# Patient Record
Sex: FEMALE | Race: WHITE | NOT HISPANIC OR LATINO | Employment: FULL TIME | URBAN - METROPOLITAN AREA
[De-identification: names, ages, dates, MRNs, and addresses within clinical notes are randomized per-mention and may not be internally consistent; named-entity substitution may affect disease eponyms.]

---

## 2019-04-08 ENCOUNTER — RECORDS - HEALTHEAST (OUTPATIENT)
Dept: LAB | Facility: CLINIC | Age: 54
End: 2019-04-08

## 2019-04-08 LAB
CHOLEST SERPL-MCNC: 145 MG/DL
FASTING STATUS PATIENT QL REPORTED: NO
HDLC SERPL-MCNC: 62 MG/DL
LDLC SERPL CALC-MCNC: 68 MG/DL
TRIGL SERPL-MCNC: 74 MG/DL

## 2019-04-09 LAB
HCV AB SERPL QL IA: NEGATIVE
HPV SOURCE: NORMAL
HUMAN PAPILLOMA VIRUS 16 DNA: NEGATIVE
HUMAN PAPILLOMA VIRUS 18 DNA: NEGATIVE
HUMAN PAPILLOMA VIRUS FINAL DIAGNOSIS: NORMAL
HUMAN PAPILLOMA VIRUS OTHER HR: NEGATIVE
SPECIMEN DESCRIPTION: NORMAL

## 2019-04-12 LAB
BKR LAB AP ABNORMAL BLEEDING: NO
BKR LAB AP BIRTH CONTROL/HORMONES: NORMAL
BKR LAB AP CERVICAL APPEARANCE: NORMAL
BKR LAB AP GYN ADEQUACY: NORMAL
BKR LAB AP GYN INTERPRETATION: NORMAL
BKR LAB AP HPV REFLEX: NORMAL
BKR LAB AP LMP: 2017
BKR LAB AP PATIENT STATUS: NORMAL
BKR LAB AP PREVIOUS ABNORMAL: NORMAL
BKR LAB AP PREVIOUS NORMAL: NORMAL
HIGH RISK?: NO
PATH REPORT.COMMENTS IMP SPEC: NORMAL
RESULT FLAG (HE HISTORICAL CONVERSION): NORMAL

## 2021-01-04 ENCOUNTER — RECORDS - HEALTHEAST (OUTPATIENT)
Dept: LAB | Facility: CLINIC | Age: 56
End: 2021-01-04

## 2021-01-04 ENCOUNTER — RECORDS - HEALTHEAST (OUTPATIENT)
Dept: ADMINISTRATIVE | Facility: OTHER | Age: 56
End: 2021-01-04

## 2021-01-04 LAB
CHOLEST SERPL-MCNC: 137 MG/DL
FASTING STATUS PATIENT QL REPORTED: NORMAL
HDLC SERPL-MCNC: 60 MG/DL
LDLC SERPL CALC-MCNC: 67 MG/DL
TRIGL SERPL-MCNC: 49 MG/DL

## 2021-01-05 ENCOUNTER — AMBULATORY - HEALTHEAST (OUTPATIENT)
Dept: VASCULAR SURGERY | Facility: CLINIC | Age: 56
End: 2021-01-05

## 2021-01-05 ENCOUNTER — AMBULATORY - HEALTHEAST (OUTPATIENT)
Dept: ADMINISTRATIVE | Facility: CLINIC | Age: 56
End: 2021-01-05

## 2021-01-05 DIAGNOSIS — I83.813 VARICOSE VEINS OF BOTH LOWER EXTREMITIES WITH PAIN: ICD-10-CM

## 2021-02-03 ENCOUNTER — OFFICE VISIT - HEALTHEAST (OUTPATIENT)
Dept: VASCULAR SURGERY | Facility: CLINIC | Age: 56
End: 2021-02-03

## 2021-02-03 DIAGNOSIS — I83.893 VARICOSE VEINS OF BILATERAL LOWER EXTREMITIES WITH OTHER COMPLICATIONS: ICD-10-CM

## 2021-02-03 RX ORDER — SUMATRIPTAN 100 MG/1
100 TABLET, FILM COATED ORAL
Status: SHIPPED | COMMUNITY
Start: 2021-02-03

## 2021-02-03 RX ORDER — IBUPROFEN 200 MG
200-400 TABLET ORAL
Status: SHIPPED | COMMUNITY
Start: 2019-05-29

## 2021-02-03 RX ORDER — ACETAMINOPHEN 325 MG/1
650 TABLET ORAL
Status: SHIPPED | COMMUNITY
Start: 2019-05-29

## 2021-03-18 ENCOUNTER — AMBULATORY - HEALTHEAST (OUTPATIENT)
Dept: NURSING | Facility: CLINIC | Age: 56
End: 2021-03-18

## 2021-04-08 ENCOUNTER — AMBULATORY - HEALTHEAST (OUTPATIENT)
Dept: NURSING | Facility: CLINIC | Age: 56
End: 2021-04-08

## 2021-05-27 VITALS — TEMPERATURE: 98.4 F | SYSTOLIC BLOOD PRESSURE: 106 MMHG | DIASTOLIC BLOOD PRESSURE: 70 MMHG | HEART RATE: 72 BPM

## 2021-06-14 NOTE — PROGRESS NOTES
This is a new consult for Varicose Veins. The patient has varicose veins that are problematic in right legs. Symptoms patient has been experiencing are heaviness, tiredness, discoloration and  swelling. Patient has not been wearing compression stockings.     Discoloration  is present.  Pt  has been using pain medication or antiinflammatory's.

## 2021-06-18 NOTE — PATIENT INSTRUCTIONS - HE
"Patient Instructions by Rani Segura LPN at 2/3/2021  9:20 AM     Author: Rani Segura LPN Service: -- Author Type: Licensed Nurse    Filed: 2/3/2021  9:47 AM Encounter Date: 2/3/2021 Status: Signed    : Rani Segura LPN (Licensed Nurse)       We are prescribing some compression stockings for you. I have included different suppliers that should help you get measured and fitting to ensure proper fitting socks. You should wear these socks as much as you can. It is especially important to wear them with long periods of sitting/standing, long car rides or if you will be flying. Compression socks should get refilled every 4-6 months. They do not need to be worn at night while in bed.    If you do a lot of standing it is good to do calf raises to help keep the blood pumping. If you sit a lot at work it is good to get up periodically to walk around. Elevation of the foot of your bed 4-6\" helps the blood return back to where it is needed.    We would like you to follow up in 3 months after wearing socks to see how you are doing.    Varicose Veins  Varicose veins are swollen, enlarged veins most often found in the legs. They are usually blue or purple in color and may bulge, twist, and stand out under the skin.  Normally, veins return blood from the body to the heart. The leg veins have one-way valves that prevent blood from flowing backward in the vein. When the valves are weak or damaged, blood backs up in the veins. This may cause some of the veins to swell and bulge and become varicose veins.  Symptoms  Varicose veins may or may not cause symptoms. If symptoms do occur, they can include:    Legs that feel tired, achy, heavy, or itchy    Leg muscle cramps    Skin changes, such as discoloration, dryness, redness, or rash (in more severe cases, you may also have sores on the skin called venous leg ulcers)  Risk Factors  There are a number of factors that increase the risk for varicose veins. These " can include:    Being a woman    Being older    Sitting or standing for long periods    Being overweight    Being pregnant    Having a family history of varicose veins  Treatment begins with simple self-help measures (see below). If these dont help, there are many procedures that can be done to shrink or remove varicose veins. Your healthcare provider can tell you more about these options, if needed.  Home care    Support or compression stockings will likely be prescribed. If so, be sure to wear them as directed. They may help improve blood flow.    Exercising helps strengthen your leg muscles and improve blood flow. To get the most benefit, choose exercises such as walking, swimming, or cycling. Also try to exercise for at least 30 minutes on most days.    Raising (elevating) your legs lets gravity help blood flow back to the heart. Sit or lie with your feet above heart level a few times throughout the day, or as directed.    Avoid long periods of sitting or standing. Change positions often. Also, move your ankles, toes and knees often. This may also help improve blood flow.    If you are overweight, talk with your healthcare provider about setting up a weight-loss plan. Maintaining a healthy weight can help reduce the strain on your veins. It may also improve symptoms, such as swelling and aching.    If you have dryness and itching, ask your provider about special lotions that can be applied to the skin to help improve symptoms.  Follow-up care  Follow up with your healthcare provider, or as directed. If imaging tests were done, youll be told the results and if there are any new findings that affect your care.  When to seek medical advice  Call your healthcare provider right away if any of these occur:    Sudden, severe leg swelling, pain, or redness    Symptoms worsen, or they dont improve with self-care    Bleeding from any affected veins    Ulcers form on the legs, ankles, or feet    Fever of 100.4 F (38 C) or  higher, or as advised by your provider  Understanding Spider and Varicose Veins  Do you often hide your legs because of the way they look? You may have noticed tiny red or blue bursts (spider veins). Or maybe you have veins that bulge or look twisted (varicose veins). If so, there are treatments that can help  What are the symptoms?  Spider veins or varicose veins may never be a problem. But sometimes they can cause legs to ache or swell. Your legs may also feel heavy and tired, or like theyre burning. These symptoms may be more severe at the end of the day. Prolonged sitting or standing can also make your symptoms worse.  Who gets spider and varicose veins?  Anyone can get spider or varicose veins. But vein problems tend to be hereditary (run in families). Other factors that can affect veins include:    Pregnancy, hormones, and birth control pills    A job where you stand or sit a lot    Extra weight or lack of exercise    Age         Spider veins look like tiny webs on the ankles, legs, and upper thighs.       Ropy, dark blue, red, or flesh-colored varicose veins are most common on the thighs, calves, and feet.    What can be done?  Spider and varicose veins can affect the way you feel about yourself. Talk to your healthcare provider about your concerns. There are treatments that can ease symptoms and make your legs look better.  Your treatment choices  Treatment may include self-care, sclerotherapy (injecting veins with a chemical), surgery, or newer nonsurgical minimally invasive therapies. Spider veins and some varicose veins can be treated with sclerotherapy. Large varicose veins can often be treated with newer minimally invasive procedures and, in rare cases, surgery may be needed.     Please call [facility] to schedule an appointment. If you received a prescription please bring it with you to your appointment. You may call one of the locations below, although some locations are  limited to what they carry.    Office Locations  New Locations  [facility]  Home Medical Equipment  [address], [place] [address]  Orthotics and Prosthetics ([facility])  [address], [place] [address]  Phone [phone] /Fax [phone]    [facility]   [address]   Medical Equipment [address]/Orthotics and Prosthetics [address]  [place] [address]   Phone: [phone]  Fax [phone]    [facility]  [address]  [address]  [place] [address]  Phone: [phone]  Fax: [phone]    [facility] Medical [facility]ld[facility]   [address]  [place] [address]  Phone: [phone]  Fax: [phone]    [facility] Professional Bldg[facility]  [address]  [place] [address]  Phone: [phone]  Fax: [phone]    [facility]  [address]  [address]  [place] [address]  Phone: [phone]  Fax: [phone]    [facility]  [address]   [place] [address]   Phone: [phone]  Fax: [phone]    [place]   [address] [place] [address]   Phone: [phone]  Fax: [phone]    [facility]   [address]             [address]                 [address]     [place] [address]      [place] [address]         [place] [address]  [phone]  [phone]     [phone]               Fax: [phone]  [email or web address]                                                [facility]  [address] [place] [address]  [phone]  [phone]  [email or web address]    Bobbi  Julian  [phone]  [email or web address]    [facility] Medical supply   [phone]    [place] [address]  [place], [place] [address] [phone]

## 2021-06-27 ENCOUNTER — HEALTH MAINTENANCE LETTER (OUTPATIENT)
Age: 56
End: 2021-06-27

## 2021-06-30 NOTE — PROGRESS NOTES
Progress Notes by Jeff Owusu MD at 2/3/2021  9:20 AM     Author: Jeff Owusu MD Service: -- Author Type: Physician    Filed: 2/6/2021  3:02 PM Encounter Date: 2/3/2021 Status: Signed    : Jeff Owusu MD (Physician)           [facility] Vein Consult      Assessment:     1. varicose veins, bilateral   2. spider veins, bilateral       Plan:     1. Treatment options of conservative therapy of stockings use, exercise, weight loss, elevating legs when possible.    2. Script for compression stockings 20-30 mm hg  3. Ultrasound to evaluate legs for incompetency of both deep and superficial system .   4. Surgical treatment, discussed briefly  5. Follow up: 3 months.   6. Call for any questions concerns or issues    Subjective:      Crystal Rodriguez is a 55 y.o. female  who was referred by Jacqui Blevins MD  for evaluation of varicose veins. Symptoms include pain, aching, fatigue, burning, edema and dermatitis. Patient has history of leg selling, pain and vein issues that have progressed. Pain and symptoms have affected daily living and work activities needing medications. Here for evaluation today. no stocking or compression devic use    Allergies:Patient has no known allergies.    History reviewed. No pertinent past medical history.    History reviewed. No pertinent surgical history.    Current Outpatient Medications   Medication Sig   ? acetaminophen (TYLENOL) 325 MG tablet Take 650 mg by mouth.   ? DOXYCYCLINE CALCIUM ORAL Take by mouth.   ? ibuprofen (ADVIL,MOTRIN) 200 MG tablet Take 200-400 mg by mouth.   ? multivitamin/iron/folic acid (CERTAVITE-ANTIOXIDANT ORAL) Take 1 tablet by mouth.   ? SUMAtriptan (IMITREX) 100 MG tablet Take 100 mg by mouth.       History reviewed. No pertinent family history.     reports that she quit smoking about 4 years ago. She has never used smokeless tobacco.      Review of Systems:  Pertinent items are noted in HPI.  A 12 point comprehensive review of  systems was negative except as noted.   Patient has symptomatic veins and changes of bilateral legs. These have progressed to the point of causing symptoms on a daily basis. This causes issues with daily activities and chores such as washing dishes, vacuuming, outdoor upkeep and standing for long lengths of time       Objective:     Vitals:    02/03/21 0928   BP: 106/70   Pulse: 72   Temp: 98.4  F (36.9  C)   TempSrc: Oral     There is no height or weight on file to calculate BMI.    EXAM:  GENERAL: This is a well-developed 55 y.o. female who appears her stated age  HEAD: normocephalic  HEENT: Pupils equal and reactive bilaterally  MOUTH: mucus membranes intact. Normal dentation  CARDIAC: RRR without murmur  CHEST/LUNG:  Clear to auscultation bilaterally  ABDOMEN: Soft, nontender, nondistended, no masses noted   NEUROLOGIC: Focally intact, nonfocal, alert and oriented x 3  INTEGUMENT: No open lesions or ulcers  VASCULAR: Pulses intact, symmetrical upper and lower extremities. There areskin changes consistent with chronic venous insufficiency. Varicose veins present in bilateral greater saphenous distribution. Spider veins present bilateral.                    Imaging:    pending    Jeff Owusu MD  General Surgery [phone]  Vascular Surgery [phone]

## 2021-08-04 ENCOUNTER — ANCILLARY PROCEDURE (OUTPATIENT)
Dept: VASCULAR ULTRASOUND | Facility: CLINIC | Age: 56
End: 2021-08-04
Attending: SPECIALIST
Payer: COMMERCIAL

## 2021-08-04 ENCOUNTER — OFFICE VISIT (OUTPATIENT)
Dept: VASCULAR SURGERY | Facility: CLINIC | Age: 56
End: 2021-08-04
Attending: SPECIALIST
Payer: COMMERCIAL

## 2021-08-04 VITALS — TEMPERATURE: 98.3 F | HEART RATE: 64 BPM | SYSTOLIC BLOOD PRESSURE: 120 MMHG | DIASTOLIC BLOOD PRESSURE: 78 MMHG

## 2021-08-04 DIAGNOSIS — I83.891 SYMPTOMATIC VARICOSE VEINS, RIGHT: Primary | ICD-10-CM

## 2021-08-04 DIAGNOSIS — I83.893 VARICOSE VEINS OF BILATERAL LOWER EXTREMITIES WITH OTHER COMPLICATIONS: ICD-10-CM

## 2021-08-04 PROCEDURE — 99213 OFFICE O/P EST LOW 20 MIN: CPT | Performed by: SPECIALIST

## 2021-08-04 PROCEDURE — 93970 EXTREMITY STUDY: CPT

## 2021-08-04 PROCEDURE — 93970 EXTREMITY STUDY: CPT | Mod: 26 | Performed by: SURGERY

## 2021-08-04 ASSESSMENT — PAIN SCALES - GENERAL: PAINLEVEL: NO PAIN (0)

## 2021-08-04 NOTE — PROGRESS NOTES
Follow Up: Varicose Veins/ Venous Insufficiency    Crystal Rodriguez is a 55 year old  female here for followup. She has worn stockings now for 3         months. I saw them previously in February / 03 / 2021.  Continued progression of disease and symptoms and issues; reviewed ultrasound results. Patient has ongoing symptoms with pain and swelling needing intervention with pain meds secondary to interfering with daily activities and work. This now inhibits daily chores and activities.     Allergies:Patient has no known allergies.    No past medical history on file.    No past surgical history on file.    CURRENT MEDS:  Current Outpatient Medications   Medication     acetaminophen (TYLENOL) 325 MG tablet     ibuprofen (ADVIL,MOTRIN) 200 MG tablet     multivitamin/iron/folic acid (CERTAVITE-ANTIOXIDANT ORAL)     SUMAtriptan (IMITREX) 100 MG tablet     No current facility-administered medications for this visit.       No family history on file.     reports that she quit smoking about 4 years ago. She has never used smokeless tobacco.    Review of Systems:  Negative except symptomatic veins both sides progressed under conservative therapy and treatment  Patient has symptomatic veins and changes of bilateral legs. These have progressed to the point of causing symptoms on a daily basis. This causes issues with daily activities and chores such as washing dishes, vacuuming, outdoor upkeep and standing for long lengths of time. She has worn compression now for greater than 3 months, worked on an exercise and weight loss program and continues to have symptoms.     OBJECTIVE:  Vitals:    08/04/21 1038   BP: 120/78   Pulse: 64   Temp: 98.3  F (36.8  C)     There is no height or weight on file to calculate BMI.    EXAM:  GENERAL: This is a well-developed 55 year old female who appears her stated age  HEAD: normocephalic  HEENT: Pupils equal and reactive bilaterally  CARDIAC: RRR without murmur  CHEST/LUNG:  Clear to  auscultation  ABDOMEN: Soft, nontender, nondistended, no masses    NEUROLOGIC: Focally intact, nonfocal  VASCULAR: Pulses intact, symmetrical upper and lower extremities. Varicose veins, Spider veins and Chronic venous stasis changes, right    Imaging:    Study Result    Narrative & Impression   BILATERAL Venous Insufficiency Ultrasound (Date: 08/04/21)    BILATERAL Lower Extremity          Indication:  Symptomatic varicose veins/pain/swelling     Previous: None     Patient History: Swelling and Stasis     Presenting Symptoms:  Swelling, Varicose Veins, Pain and Stasis     Technique:   Supine and Upright Ultrasound of the Deep and Superficial Veins with Valsalva and Compression Augmentation Maneuvers. Duplex Imaging is performed utilizing gray-scale, Two-dimensional images, color-flow imaging, Doppler waveform analysis, and Spectral doppler imaging done with provacative maneuvers.      Incompetency Criteria:  Deep vein reflux reported when greater than 1000 msec flow reversal. Superficial vein reflux reported when equal to or greater than 600 msec flow reversal.  vein reflux reported as greater than 350 msec flow reversal.      Right  Leg Deep Veins    CFV SFJ PFV PROX FV   PROX FV MID FV DIST POP V. PERON.   V. PTV'S   Compressibility  (FC,PC,NC) FC FC FC FC FC FC FC FC FC   Reflux +   - - - - -   -         Right Leg Saphenous Veins  Location SFJ PROX THIGH KNEE MID CALF SPJ PROX CALF MID CALF   RT GSV (mm) 10 8 8 4         Reflux + + + +         RT SSV (mm)         4 4 3   Reflux         - - -      Left Leg Deep Veins    CFV SFJ PFV PROX SFV PROX SFV MID SFV DIST POP V. PERON. V. PTV'S   Compressibility  (FC,PC,NC) FC FC FC FC FC FC FC FC FC   Reflux -   - - - - -   -         Lt Leg Saphenous Veins   Location SFJ PROX THIGH KNEE MID CALF SPJ PROX CALF MID CALF   LT GSV (mm) 7 5 6 3         Reflux - - - -         LT SSV (mm)         5 4 3   Reflux         - - -      Impression:        Reference:     Compressibility: FC= Fully compressible, PC= Partially compressible, NC= Non-compressible, NV= Not Visualized     Reflux: (+) Incompetent  (-) Competent, (NV) = Not Visualized     Interpretation criteria:          Duration of Retrograde flow (milliseconds)  Category Deep Veins Superficial Veins  veins   Competent < 1000ms < 600ms < 350ms   Incompetent > 1000ms > 600ms > 350ms                Assessment/Plan:    She has  incompetency and insuffiencey of the [place]  Greater  saphenous vein.  Right measures 10 mm  at the junction. Deep systems are intact. No DVTs. Great candidate for endovenous  closure. We spent 20 minutes today and discussed the procedure. The risks of anesthesia, infection, bleeding, clotting, DVTs, numbess at the insertion site dermatome and  the process and procedure were discussed. Answered all questions today. Will submit this to Crystal Rodriguez's insurance if needed for pre approval.Will set this up when approved. Discussed need to have a  and procedure woul take around 30 minutes with total time 2-3 hours. Also understands a small screening ultrasound 2-3 days out to rule out clot formation at the closed vessel.    DIAGNOSIS: Venous insufficiency of the  right greater saphenous vein      PROCEDURE: Endovenous closure of the right greater saphenous vein    Jeff Owusu MD  [facility] Surgery Dept.

## 2021-08-04 NOTE — PATIENT INSTRUCTIONS
Varicose Vein Pre-Procedure Instructions/Vein Ablation     You are scheduled for a varicose vein treatment on your legs. The following is some helpful information for you, in regards to your treatment.    **Important:  A  will be needed post procedure.  (Unable to use Taxi or Uber).     We will supply a thigh high compression stocking for you. Please bring your compression sock as we only have sizes medium to X-large.    Please be aware, it is not advised to fly within 3 weeks post procedure    Please wear comfortable clothing.  We recommend that you bring a change of under clothes; they may get stained by the cleansing solution.    Feel free to bring a personal music player or a CD to listen to during your procedure.    Take your routine medications as you normally would with exception to blood thinners. Aspirin is ok to continue.    If you take Warfarin, Xarelto, or Eliquis this will need to be HELD prior to procedure according to primary care provider/doctor or cardiology who prescribes this medication.    Please notify us if you take this medication.    It is ok to eat prior to this procedure.    Please allow 1- 2 hours for your appointment.    For any questions regarding your procedure please call   [phone] to speak with the nurse.    If you would like a Good Jillian Estimate for your upcoming service/procedure contact Cost of Care Estimates at [phone], advocates are available Monday through Friday 8am - 5pm.    Radiofrequency Ablation Codes  26517 for first vein in either leg  89906 for second vein    Please have the following information available:  1. Patient name and date of birth  2. Insurance company, plan name, ID and group numbers  3. Description of the service/procedure and the associated procedure code numbers, if available. If more than one (1) procedure code, indicate which will be the primary procedure code.   4. The facility where the service/procedure will be performed.  5. The  name of the physician involved with the service/procedure.  6. Appointment date of service.  7. Telephone number to call with the information.    Radiofrequency Ablation (RFA) Treatment for Varicose Veins    Radiofrequency ablation (RFA) is a procedure to treat varicose veins. It uses heat created from radiofrequency (RF).    Varicose veins are swollen, enlarged veins. They happen most often in the legs. Varicose veins can develop when valves in your veins become damaged. This causes problems with blood flow. Over time, too much blood collects in your veins. The veins may bulge, twist, and stand out under your skin. They can also cause symptoms such as aching, cramping, or swelling in your legs.    During RFA treatment, RF heat is sent into your vein through a thin, flexible tube (catheter). This closes off blood flow in the main problem vein.           With RFA treatment, a catheter that contains RF heat is used to seal off the main problem vein.      Getting ready for your treatment  Follow any instructions from your healthcare provider.    Tell your provider if you:    Are pregnant or think you may be pregnant    Are breastfeeding    Smoke or use alcohol on a regular basis    Have any allergies or intolerances to certain medicines. Explain what reaction you have had to these medicines in the past.    Tell your provider about any medicines you are taking. You may need to stop taking all or some of these before the test. This includes:    Medicines that can thin your blood or prevent clotting (anticoagulants)    All prescription medicines    Over-the-counter medicines such as aspirin or ibuprofen    Street drugs    Herbs, vitamins, and other supplements    Follow any directions you're given for not eating or drinking before the procedure.    The day of your treatment  The treatment takes 45 to 60 minutes. The entire treatment (including time to prepare and recover) takes about 1 to 3 hours. You can go home the same  day. For the treatment:     You'll lie down on a hospital bed.    An imaging method, such as ultrasound, is used to guide the procedure.    The leg to be treated is injected with numbing medicine.    Once your leg is numb, a needle makes a small hole (puncture) in the vein to be treated.    The catheter with the RF heat source is inserted into your vein.    More numbing medicine may be injected around your vein.    Once the catheter is in the right position, it is then slowly drawn backward. As the catheter sends out heat, the vein is closed off.    In some cases, other side branch varicose veins may be removed or tied off through a few small cuts (incisions).    When the treatment is done, the catheter is removed. Pressure is applied to the insertion site to stop any bleeding. An elastic compression stocking or a bandage may then be put on your leg.    Recovering at home  Once at home, follow all the instructions you've been given. Be sure to:    Take all medicines as directed    Care for the catheter insertion site as directed    Check for signs of infection at the catheter insertion site (see below)    Wear elastic stockings or bandages as directed    Keep your legs raised (elevated) as directed    Walk a few times a day    Avoid heavy exercise, lifting, and standing for long periods as advised    Avoid air travel, hot baths, saunas, or whirlpools as advised    Call your healthcare provider  Call your healthcare provider if you have any of the following:    Fever of 100.4 F (38 C) or higher, or as directed by your provider    Chest pain or trouble breathing    Signs of infection at the catheter insertion site. These include increased redness or swelling (inflammation), warmth, increasing pain, bleeding, or bad-smelling discharge.    Severe numbness or tingling in the treated leg    Severe pain or swelling in the treated leg       Follow-up  You'll have a follow-up visit with your healthcare provider within a  week. An ultrasound will be done to check for problems, such as blood clots. Your provider will discuss further treatments with you, if needed.  Risks and possible complications   These include the following:    Bleeding    Infection    Blood clots    Damage to the nerves in the treated area    Irritation or burning of the skin over the treated vein    Treatment doesn't improve the look or the symptoms of the problem veins    Risks of any medicines used during the treatment      Date Last Reviewed: 5/1/2016 2000-2017 [employer]. [address] [place], [place] [id number]. All rights reserved. This information is not intended as a substitute for professional medical care. Always follow your healthcare professional's instructions.    Self-Care for Spider and Varicose Veins  Your healthcare provider may suggest that you try self-care. Exercising and maintaining a healthy weight may keep problem veins from getting worse. Wearing elastic stockings and elevating your legs can help improve blood flow. Taking breaks when you sit or stand helps, too.         The top of the elastic stocking should be below the bend in your knee for a proper fit.      Exercising  Exercising is good for your veins because it improves blood flow. Walking, cycling, or swimming are great exercises for vein health. But be sure to check with your healthcare provider before starting any exercise program. Also, keep these hints in mind:    When exercising, start out slowly and try to build up to 30 minutes on most days.    Elevate your legs above heart level after exercise to keep blood from pooling in veins.    Maintaining a healthy weight  Being overweight puts extra pressure on your veins. To maintain a healthy weight, try these tips:    Choose lean meats, fish, and skinless chicken.    Use low-fat dairy products.    Eat foods high in fiber, such as whole grains, fruits, and vegetables.    Cut down on sugar, salt, and saturated and  trans fats.    Exercise regularly.    Wearing elastic stockings  Elastic stockings gently squeeze veins so blood flows upward. If you need elastic stockings, your healthcare provider can prescribe them for you. Follow your healthcare provider's advice about how and when to wear them. Elastic stockings come in several different levels of pressure. Ask your healthcare provider which level of pressure would benefit you the most.     Elevating your legs  Raising your legs above heart level will help relieve swelling and keep blood from pooling in veins. Try to elevate your legs for 15 to 20 minutes at the end of the day, and whenever you're relaxing. To make sure your legs are raised above heart level, prop them up on cushions or large pillows.    When sitting and standing  To keep blood moving when you have to sit or stand for long periods, try these tips:    At work, take walking breaks instead of coffee breaks. Walk during your lunch hour. Or try flexing your feet up and down 10 times each hour.    When standing, raise yourself up and down on your toes, or rock back and forth on your heels.    Date Last Reviewed: 5/1/2016 2000-2017 [employer]. [address] [place] [address]. All rights reserved. This information is not intended as a substitute for professional medical care. Always follow your healthcare professional's instructions.    [facility]  [address]  [place] [address]    [place] [phone]  [phone]  [place] [phone]  [email or web address]    [facility]   [address]             [address]                 [address]     [place] [address]      [place] [address]         [place] [address]  [phone]  [phone]            [phone]                Fax:  [phone]  [email or web address]                                              [facility]  [address] [place] [address]  [phone]  [phone]  [email or web address]    Bobbi Jordan  [phone]  [email or web address]    [facility]   [phone]    [place] [address] [place] [address]  [phone]

## 2021-10-17 ENCOUNTER — HEALTH MAINTENANCE LETTER (OUTPATIENT)
Age: 56
End: 2021-10-17

## 2021-10-20 ENCOUNTER — TELEPHONE (OUTPATIENT)
Dept: VASCULAR SURGERY | Facility: CLINIC | Age: 56
End: 2021-10-20
Payer: COMMERCIAL

## 2021-10-20 NOTE — TELEPHONE ENCOUNTER
message left for  patient regarding vein ablation procedure next week. Advised to bring a . Explained it is ok to eat and drink prior to procedure with exception of blood thinner. Verified patient's insurance [employer]. We will supply patient with a thigh high compression sock. We carry from size medium to extra large. If patient doesn't fit into them they will need to provide their own. Patient will call if any further questions.

## 2021-11-17 NOTE — TELEPHONE ENCOUNTER
Second message left. Advised to call with any questions.  Advised no vigorous activities for 2 weeks and no flights for 3 weeks.

## 2021-12-01 ENCOUNTER — OFFICE VISIT (OUTPATIENT)
Dept: VASCULAR ULTRASOUND | Facility: CLINIC | Age: 56
End: 2021-12-01
Attending: SPECIALIST
Payer: COMMERCIAL

## 2021-12-01 VITALS — SYSTOLIC BLOOD PRESSURE: 124 MMHG | HEART RATE: 76 BPM | TEMPERATURE: 98.1 F | DIASTOLIC BLOOD PRESSURE: 70 MMHG

## 2021-12-01 DIAGNOSIS — I83.891 SYMPTOMATIC VARICOSE VEINS, RIGHT: ICD-10-CM

## 2021-12-01 PROCEDURE — 258N000003 HC RX IP 258 OP 636: Performed by: SPECIALIST

## 2021-12-01 PROCEDURE — 272N000103 US ENDOVENOUS ABLATION RADIOFREQ 1ST VEIN RT

## 2021-12-01 PROCEDURE — 36475 ENDOVENOUS RF 1ST VEIN: CPT | Mod: RT | Performed by: SPECIALIST

## 2021-12-01 PROCEDURE — 250N000009 HC RX 250: Performed by: SPECIALIST

## 2021-12-01 RX ADMIN — LIDOCAINE HYDROCHLORIDE 10 ML: 10 INJECTION, SOLUTION INFILTRATION; PERINEURAL at 12:52

## 2021-12-01 RX ADMIN — LIDOCAINE HYDROCHLORIDE,EPINEPHRINE BITARTRATE: 10; .01 INJECTION, SOLUTION INFILTRATION; PERINEURAL at 12:52

## 2021-12-01 ASSESSMENT — PAIN SCALES - GENERAL: PAINLEVEL: MODERATE PAIN (5)

## 2021-12-01 NOTE — PATIENT INSTRUCTIONS
Home Care Instructions Following Radiofrequency Closure of the Greater Saphenous Vein (VNUS)    Dressing    Wear your compression for 48 hours continuously until your ultrasound after the procedure.  You can remove your stocking to shower in 24 hours but then reapply after.    Wear the stocking for two weeks after the procedure while you are up.    Activity    The day of the procedure make sure to walk around the house for a few minutes each hour until bedtime.    The day after the procedure you should advance activity as tolerated.  NO strenuous activities though for 2 weeks.  In general avoid prolonged standing in place and sitting with your legs down.  Both of these activities will cause blood to pool in your legs resulting in more swelling and discomfort.    Do not drive or operate heavy machinery for 24 hours after the procedure.    Do not take baths or use hot tubs or swimming pools until your incision site is healed.    Do not fly for the Next 3 weeks.    Post Procedure Ultrasound    You will need an ultrasound within 2-3 days following the closure procedure.  Please schedule an ultrasound if you have not already done so.    Post Procedure Signs and Symptoms    There can be a mild amount of bruising and numbness after this procedure.  This will typically take a few weeks to fade.    You may feel pain or tenderness in your inner thigh.  Mild pain medication such as Tylenol or Ibuprofen maybe helpful.    It is normal to have fluid leaking from the injection areas the day of the procedure and it may be blood tinged.    Call if you have    Fever greater than 100.8 degrees F.    Uncontrolled bleeding from the incision site.    Pain that is not relieved by rest or pain medication.    Shortness of breath    Follow -up    Please plan to see your surgeon in the office two weeks after your procedure    Call us to schedule this appointment if one has not already been made    You will receive a phone call from our  staff within 48 hours of your procedure.  Please have these instruction near by so that any questions can be addressed at that time.  If you have any concerns before that time, please do not hesitate to call us at [phone] and ask to speak to a nurse.  We want you to have a smooth recovery.    For emergencies after 4:30pm Monday - Friday, holidays and weekends:  For Dr Jeff Owusu call [facility] at [phone]  [facility] [phone]

## 2021-12-01 NOTE — PROGRESS NOTES
VNUS Radiofrequency Ablation    Pre-Procedure:  Admit  [x]Consent for Radio Frequency Ablation of the   [x]Right Saphenous Vein and/or branches  []Left Saphenous Vein and/or branches  Vitals  [x]Vital signs per routine    Nursing Orders  [x]Vein Mapping of  [x]R extremity  []L extremity  [x]Sterile Prep to extremity  [x]Obtain Tumescent Solution 500mL (NS 1000mL, 1% Lidocaine w Epi 56mL, 8.4% Sodium Bicarbonate 5.6mL)    Medications  []Diazepam (Valium) 5mg PO, May Repeat x 1 for anxiety, relaxtion.    Post-Procedure:  Admission  [x]Post Procedure care - call physician for status update  []Admit to inpatient - Please document reason for admission in chart    Interventions require inpatient services    High risk of deterioration due to comorbidities    High risk of deterioration due to nature of admitting diagnosis  Location:    Vitals  [x]Vital signs per routine    Nursing Orders  [x]Thigh High Compression Stocking 20-30mm or 30-40mm to  [x]R extremity  []L extremity  [x]Check insertion site for bleeding or hematoma.  If bleeding or hematoma occurs, apply pressure and notify MD    Discharge  [x]Discharge home if no complications arise.  [x]Give post radiofrequency ablation discharge instructions to patient.  [x]Follow up venous ultrasound 72-96 hours after procedure.  [x]Follow up appointment with MD at 2 weeks.  [x]Contact MD for further questions

## 2021-12-03 ENCOUNTER — ANCILLARY PROCEDURE (OUTPATIENT)
Dept: VASCULAR ULTRASOUND | Facility: CLINIC | Age: 56
End: 2021-12-03
Attending: SPECIALIST
Payer: COMMERCIAL

## 2021-12-03 DIAGNOSIS — I83.891 SYMPTOMATIC VARICOSE VEINS, RIGHT: ICD-10-CM

## 2021-12-03 PROCEDURE — 93971 EXTREMITY STUDY: CPT | Mod: RT

## 2021-12-03 PROCEDURE — 93971 EXTREMITY STUDY: CPT | Mod: 26 | Performed by: SURGERY

## 2021-12-21 ENCOUNTER — OFFICE VISIT (OUTPATIENT)
Dept: VASCULAR SURGERY | Facility: CLINIC | Age: 56
End: 2021-12-21
Attending: SPECIALIST
Payer: COMMERCIAL

## 2021-12-21 VITALS — HEART RATE: 72 BPM | SYSTOLIC BLOOD PRESSURE: 130 MMHG | TEMPERATURE: 98.7 F | DIASTOLIC BLOOD PRESSURE: 74 MMHG

## 2021-12-21 DIAGNOSIS — I83.893 VARICOSE VEINS OF BILATERAL LOWER EXTREMITIES WITH OTHER COMPLICATIONS: Primary | ICD-10-CM

## 2021-12-21 PROCEDURE — 99212 OFFICE O/P EST SF 10 MIN: CPT | Performed by: SPECIALIST

## 2021-12-21 PROCEDURE — G0463 HOSPITAL OUTPT CLINIC VISIT: HCPCS

## 2021-12-21 ASSESSMENT — PAIN SCALES - GENERAL: PAINLEVEL: EXTREME PAIN (8)

## 2021-12-21 NOTE — PATIENT INSTRUCTIONS
Use compression socks as much as possible when on feet, long car rides and on air planes.  Return to clinic in 4 months.  Call if any questions [phone]

## 2021-12-29 NOTE — PROGRESS NOTES
Post Procedure Note Endovenous Closure    S: Crystal Rodriguez is a 56 year old female S/P Right  leg endovenous closure ofRight lower ext. Two weeks out from procedure. Doing well, wore female  thigh high socks. Minimal discomfort. Some superficial phlibitis. Which is resolving.     O:   Vitals:    12/21/21 1010   BP: 130/74   Pulse: 72   Temp: 98.7  F (37.1  C)       Status: doing well    General: no apparent distress  Legs look good no signs of infection, incisions healing nicely.      VCSS  PAIN:: Severe: Daily, severe limiting activities or requiring regular use of pain meds  Varicose Veins:: Mild: Few scattered  Venous Edema:: Absent: None  Skin Pigmentation:: Absent: None  Inflamation:: Absent: None  Induration:: Absent: None  Number of active ulcers:: 0  Active ulcer duration:: None  Active ulcer diameter:: None  Compression Therapy:: Not used or patient not compliant  VCSS Score:: 4  Side:: Bilateral      Imaging:    Study Result    Narrative & Impression   Right Venous Ultrasound Status Post Radiofrequency Ablation (Date: 12/03/21)        Indication: Follow-up saphenous vein Radiofrequency ablation     Date of Procedure: Right 12/01/21       Right CFV/SFJ Compression:  Fully Compressible     Reference:   (FC)-Fully Compressible           (PC)-Partially Compressible   (NC) Non-Compressible     Location Right GSV   Proximal Thigh NC   Mid Thigh NC   Distal Thigh NC   Knee NC   Proximal Calf FC      Impression: Successful ablation of right leg greater saphenous vein from the proximal thigh to the knee level.  No evidence of DVT in the right common femoral vein       A/P: S/p endovenous closure. For insuffiencey of veins     May switch to knee high support socks   Resume all activities   RTC 4 months   Call for any questions or concerns     Jeff Owusu MD   [facility]

## 2022-05-17 ENCOUNTER — OFFICE VISIT (OUTPATIENT)
Dept: VASCULAR SURGERY | Facility: CLINIC | Age: 57
End: 2022-05-17
Attending: SPECIALIST
Payer: COMMERCIAL

## 2022-05-17 VITALS
TEMPERATURE: 97.6 F | RESPIRATION RATE: 12 BRPM | HEART RATE: 56 BPM | SYSTOLIC BLOOD PRESSURE: 116 MMHG | DIASTOLIC BLOOD PRESSURE: 78 MMHG

## 2022-05-17 DIAGNOSIS — I83.893 VARICOSE VEINS OF BILATERAL LOWER EXTREMITIES WITH OTHER COMPLICATIONS: Primary | ICD-10-CM

## 2022-05-17 PROCEDURE — 99213 OFFICE O/P EST LOW 20 MIN: CPT | Performed by: SPECIALIST

## 2022-05-17 ASSESSMENT — PAIN SCALES - GENERAL: PAINLEVEL: MILD PAIN (2)

## 2022-05-17 NOTE — PROGRESS NOTES
[facility]      Patient is here for a 4 month follow up  to discuss post right RFA of GSV. Patient stated leg is better but she does still have some pain rated 2 out of 10. Taking Tylenol occasionally.     Pt is currently taking no meds that would impact our treatment plan.    /78   Pulse 56   Temp 97.6  F (36.4  C) (Oral)   Resp 12   The provider has been notified that the patient has no concerns.     Questions patient would like addressed today are: If some of the bulging areas will still go away on the right leg.    Refills are needed: N/A    Has homecare services and agency name:  Latonia SWARTZ RN

## 2022-05-17 NOTE — PATIENT INSTRUCTIONS
"You should wear compression as much as you can. It is especially important to wear compression with long periods of sitting/standing, long car rides or if you will be flying. Compression socks should get refilled every 4-6 months. If you do a lot of standing it is good to do calf raises to help keep the blood pumping. If you sit a lot at work it is good to get up periodically to walk around. Elevation of the foot of your bed 4-6\" helps the blood return back to where it is needed. They do not need to be   worn at night while in bed. We can refill your compression prescription for 1 year otherwise your primary is able to refill them for you.    Please call us with any issues or questions [phone].    [facility]    [facility]  [address]  [place] [address]  Phone: [phone]    [facility]   [address] ([facility] [address]  Phone: [phone]    [facility]  [address]   [place] [address]   Phone: [phone]    [facility]  [address]  [place] [address]  Phone: [phone]    [facility]   [address]  [place] [address]  Phone: [phone]    [facility]  [address]  [place] [address]  Phone: [phone]    [facility]  [address]  [address]  [place] [address]  Phone: [phone]    [place]   [address] [place] [address]   Phone: [phone]      [facility]   [address]             [address]                 [address]     [place] [address]      [place] [address]         [place] [address]  Phone: " [phone]      Phone: [phone]            Phone: [phone]  Fax: [phone]          Fax:[phone]                 Fax: [phone]                                     Bobbi Jordan  [phone]  [email or web address]

## 2022-05-24 NOTE — PROGRESS NOTES
[facility] Surgery Follow up    HPI:    56 year old year old female who returns for a follow up.  She is here for 4-month follow-up.  She had an RFA done of her right GSV.  She has decrease of her veins but still has some that are enlarged and causing symptoms she rates her pain at about a 2 out of 10 she takes medications for it occasionally.  She comes in to seek what her options may be in count of what things could have are available for her.  She is otherwise in normal state of health she wears compression regularly.    Allergies:Patient has no known allergies.    History reviewed. No pertinent past medical history.    History reviewed. No pertinent surgical history.    CURRENT MEDS:  Current Outpatient Medications   Medication     acetaminophen (TYLENOL) 325 MG tablet     ibuprofen (ADVIL,MOTRIN) 200 MG tablet     multivitamin/iron/folic acid (CERTAVITE-ANTIOXIDANT ORAL)     SUMAtriptan (IMITREX) 100 MG tablet     No current facility-administered medications for this visit.       History reviewed. No pertinent family history.     reports that she quit smoking about 5 years ago. She has never used smokeless tobacco.    Review of Systems:  Negative except still some remaining veins in the right leg status post closure.  Otherwise normal state of health.Otherwise twelve system of review is negative.      OBJECTIVE:  Vitals:    05/17/22 1002   BP: 116/78   Pulse: 56   Resp: 12   Temp: 97.6  F (36.4  C)   TempSrc: Oral     There is no height or weight on file to calculate BMI.    Status: doing well    EXAM:  GENERAL: This is a well-developed 56 year old female who appears her stated age  HEAD: normocephalic  HEENT: Pupils equal and reactive bilaterally  CARDIAC: RRR without murmur  CHEST/LUNG:  Clear to auscultation  ABDOMEN: Soft, nontender, nondistended, no masses    NEUROLOGIC: Focally intact, nonfocal  VASCULAR: Pulses intact, symmetrical upper and lower extremities.    Side:: Right  VCSS  PAIN:: Moderate: Daily  moderate activity limitation, occasional pain medication  Varicose Veins:: Mild: Few scattered  Venous Edema:: Absent: None  Skin Pigmentation:: Absent: None  Inflamation:: Absent: None  Induration:: Absent: None  Number of active ulcers:: 0  Active ulcer duration:: None  Active ulcer diameter:: None  Compression Therapy:: Intermittent use of stockings  VCSS Score:: 4  CEAP:: Simple varicose veins only    LABS:  No results found for: WBC, HGB, HCT, MCV, PLT  INR/Prothrombin Time  @LABRCNTIP(NA,K,CL,co2,bun,creatinine,labglom,glucose,calcium)@  No results found for: HGBA1C  No results found for: ALT, AST, GGT, ALKPHOS, BILITOT     Images: Reviewed her past studies procedure notes and post procedure notes    PACS Images     Show images for US Venous Post Ablation Leg Right         Study Result    Narrative & Impression   Right Venous Ultrasound Status Post Radiofrequency Ablation (Date: 12/03/21)        Indication: Follow-up saphenous vein Radiofrequency ablation     Date of Procedure: Right 12/01/21       Right CFV/SFJ Compression:  Fully Compressible     Reference:   (FC)-Fully Compressible           (PC)-Partially Compressible   (NC) Non-Compressible     Location Right GSV   Proximal Thigh NC   Mid Thigh NC   Distal Thigh NC   Knee NC   Proximal Calf FC      Impression: Successful ablation of right leg greater saphenous vein from the proximal thigh to the knee level.  No evidence of DVT in the right common femoral vein         Assessment/Plan:   Varicose veins of bilateral lower extremities with other complications     Patient has some remaining veins we discussed doing stab phlebectomy versus just conservative therapy and management at this time point she can think about things get back to us.  In the meantime or new prescription for socks were given answered all questions and will be available if she decides to proceed    Return if symptoms worsen or fail to improve.     Jeff Owusu MD ,MD  [facility]  Department of Surgery

## 2022-07-24 ENCOUNTER — HEALTH MAINTENANCE LETTER (OUTPATIENT)
Age: 57
End: 2022-07-24

## 2022-08-02 ENCOUNTER — LAB REQUISITION (OUTPATIENT)
Dept: LAB | Facility: CLINIC | Age: 57
End: 2022-08-02

## 2022-08-02 DIAGNOSIS — Z80.8 FAMILY HISTORY OF MALIGNANT NEOPLASM OF OTHER ORGANS OR SYSTEMS: ICD-10-CM

## 2022-08-02 LAB — TSH SERPL DL<=0.005 MIU/L-ACNC: 1.27 UIU/ML (ref 0.3–4.2)

## 2022-08-02 PROCEDURE — 84443 ASSAY THYROID STIM HORMONE: CPT | Performed by: PHYSICIAN ASSISTANT

## 2022-10-02 ENCOUNTER — HEALTH MAINTENANCE LETTER (OUTPATIENT)
Age: 57
End: 2022-10-02

## 2023-08-12 ENCOUNTER — HEALTH MAINTENANCE LETTER (OUTPATIENT)
Age: 58
End: 2023-08-12

## 2024-02-27 ENCOUNTER — LAB REQUISITION (OUTPATIENT)
Dept: LAB | Facility: CLINIC | Age: 59
End: 2024-02-27

## 2024-02-27 DIAGNOSIS — Z13.6 ENCOUNTER FOR SCREENING FOR CARDIOVASCULAR DISORDERS: ICD-10-CM

## 2024-02-27 DIAGNOSIS — G47.00 INSOMNIA, UNSPECIFIED: ICD-10-CM

## 2024-02-27 PROCEDURE — 84443 ASSAY THYROID STIM HORMONE: CPT | Performed by: FAMILY MEDICINE

## 2024-02-27 PROCEDURE — 80053 COMPREHEN METABOLIC PANEL: CPT | Performed by: FAMILY MEDICINE

## 2024-02-27 PROCEDURE — 80061 LIPID PANEL: CPT | Performed by: FAMILY MEDICINE

## 2024-02-28 LAB
ALBUMIN SERPL BCG-MCNC: 4.5 G/DL (ref 3.5–5.2)
ALP SERPL-CCNC: 101 U/L (ref 40–150)
ALT SERPL W P-5'-P-CCNC: 27 U/L (ref 0–50)
ANION GAP SERPL CALCULATED.3IONS-SCNC: 12 MMOL/L (ref 7–15)
AST SERPL W P-5'-P-CCNC: 29 U/L (ref 0–45)
BILIRUB SERPL-MCNC: 0.8 MG/DL
BUN SERPL-MCNC: 17.7 MG/DL (ref 8–23)
CALCIUM SERPL-MCNC: 9.6 MG/DL (ref 8.8–10.2)
CHLORIDE SERPL-SCNC: 107 MMOL/L (ref 98–107)
CHOLEST SERPL-MCNC: 150 MG/DL
CREAT SERPL-MCNC: 0.88 MG/DL (ref 0.51–0.95)
DEPRECATED HCO3 PLAS-SCNC: 26 MMOL/L (ref 22–29)
EGFRCR SERPLBLD CKD-EPI 2021: 75 ML/MIN/1.73M2
FASTING STATUS PATIENT QL REPORTED: ABNORMAL
GLUCOSE SERPL-MCNC: 100 MG/DL (ref 70–99)
HDLC SERPL-MCNC: 63 MG/DL
LDLC SERPL CALC-MCNC: 57 MG/DL
NONHDLC SERPL-MCNC: 87 MG/DL
POTASSIUM SERPL-SCNC: 4.1 MMOL/L (ref 3.4–5.3)
PROT SERPL-MCNC: 6.8 G/DL (ref 6.4–8.3)
SODIUM SERPL-SCNC: 145 MMOL/L (ref 135–145)
TRIGL SERPL-MCNC: 151 MG/DL
TSH SERPL DL<=0.005 MIU/L-ACNC: 1.15 UIU/ML (ref 0.3–4.2)

## 2024-03-09 ENCOUNTER — HEALTH MAINTENANCE LETTER (OUTPATIENT)
Age: 59
End: 2024-03-09

## 2024-09-25 NOTE — PATIENT INSTRUCTIONS
"          Surgery for Varicose Veins  If you have large varicose veins, surgery may be the best choice. But it will not prevent new varicose veins from forming. Surgery is most often done in a hospital or surgery center on an outpatient basis.  Varicose vein surgery    What is microphlebectomy?  Phlebectomy (say \"Nancy\") is a procedure used to remove varicose veins. These are twisted and enlarged veins near the surface of the skin. The procedure is also called microphlebectomy, ambulatory phlebectomy or stab avulsion.    How is the procedure done?  The procedure is usually done in your doctor's office. You will get medicine to numb the area.  Your doctor will make several tiny cuts (incisions) in the skin. The varicose veins will be removed through the cuts.  You most likely will not need stitches to close the cuts.    What can you expect after the procedure?  Your doctor may wrap your leg in a bandage. You may also wear compression stockings. Your doctor will tell you how long to wear them.  You can go home the same day. You will probably be able to do your usual activities the next day. You may have a little bruising and numbness.  Follow-up care is a key part of your treatment and safety. Be sure to make and go to all appointments, and call your doctor if you are having problems. It's also a good idea to know your test results and keep a list of the medicines you take.    Current as of: December 19, 2022  Author: [facility] Staff  Medical Review:Yvrose Kennedy MD - Family Medicine & Simone Snider MD - Family Medicine & Cesar Valenzuela MD - Vascular Surgery    Crystal,  Your visit to [facility] for your surgery or procedure is coming soon and we look forward to seeing you! This friendly reminder and pre-procedure checklist will help to ensure your procedure/surgery goes smoothly and meets your expectations. At [facility], our goal is to provide you with a great patient " experience and to deliver genuine, professional care to every patient.   Please complete all the steps in advance of your visit. If you have any questions about the items listed below, please give our office a call. We can be reached at [phone] or visit our website at [email or web address]  for more information.     Procedure: Phlebectomy of right  Surgeon: Dr. Jeff Owusu  Procedure Date :    Procedure Location: [facility]:  [address] [place] [address] [address] [place] [address] (Fax: [phone])   Please enter through the main entrance. Take the elevators to the 3rd floor. Check in at  of [address].   Procedure Time :    Arrival Time:   Admission Type: Outpatient  Post Operative Appointment with Dr Jeff Owusu:     IF YOU NEED TO RESCHEDULE OR CANCEL YOUR PROCEDURE FOR ANY REASON PLEASE CALL THE CLINIC AS SOON AS POSSIBLE -[phone].    Complete the following checklist before your procedure/surgery    [] Contact your insurance regarding coverage. We will do a prior authorization, but please be aware this doesn't mean you are covered at 100%. If your insurance has changed please notify us.  Please check with your insurance for coverage and your out of pocket.  Stab phlebectomy outpatient procedure code: 51425  Stab phlebectomy in clinic procedure code: 81518  Vein ligation procedure code: 35301  If you would like a Good Jillian Estimate for your upcoming service/procedure contact Cost of Care Estimates at [phone], advocates are available Monday - Friday 8am - 5pm.  You may also submit a request online through your [employer] account.  If your procedure is at [facility] please contact the numbers below for Cost of Care Estimates.   - Facility Charge: [phone]    Anesthesiology charge:  [phone]    For all self-pay, estimate, or anesthesia billing questions at [facility], the contact information is below:  Who  to contact: Zayda ROGELIO  [facility] number: [phone]  Prepay number: [phone]    [] Pre-Operative Physical   You will need a Pre-Op physical within 30 days of surgery date from your primary provider.     [] Pre-Operative Medication Instructions  Contact your prescribing provider for instructions before discontinuing any medications including anticoagulants. (Examples: Coumadin, Heparin, Xarelto, Eliquis, Plavix, Pradaxa, Effiient, Briliant) We would like you stop them 3-5 days before surgery, depending on the medicaion. HOWEVER, please follow the advice of your primary care provider. Most surgeons will have you remain on your aspirin.      Not Applicable    If you take these diabetic medications, please discuss with your primary doctor and follow the hold instructions:   Hold seven (7) days prior for once weekly injectable doses [semaglutide (Ozempic, Wegovy), dulaglutide (Trulicity), exenatide ER (Bydureon), tirzepatide (Mounjaro)]  Hold the day before and day of for once daily injectable GLP-1 agonists [exenatide (Byetta), liraglutide (Saxenda, Victoza)]  Hold seven (7) days for oral semaglutide (Rybelsus)     Tell your healthcare provider about all medicines you take and any allergies you may have.      [] Fasting Requirements  Nothing to eat for 8 hours before surgery unless instructed differently by the surgery nurse.   You may have clear liquids up to two hours before your arrival time (coffee, tea, water, or Gatorade. No cream or milk)  If your primary care provider has instructed you to continue oral medications, you may take them on the morning of surgery with a small sip of water.    No alcohol or smoking after 12:00am the day of surgery.    [] You will receive a call from a surgery nurse 3-5 days prior to surgery.     []DO NOT BRING FMLA WITH TO SURGERY.  These should be sent to the provider's office by fax to [phone]     Discharge Instructions for Varicose Vein  Surgery    You had a procedure in which your varicose veins were surgically removed. Here s what you can do following surgery to help with your recovery.  Home care Recommendations for taking care of yourself at home include the following:    Ask someone to help you run errands or do household chores for a few days after surgery.    Keep your legs raised when you re sitting or lying down.    Begin a regular walking program, starting the day after surgery. Just walk for a few minutes at first; then work up to 5 minutes at a time. Gradually increase to 15 to 20 minutes at a time, 2 to 3 times a day.      Post Op Instructions:    You will be discharged with compression wraps from your toe to thigh and will wear the wraps for 5-7 days. You can remove the wraps to shower after 48 hours. Please ensure you continue wrapping your leg with the compression wrap for at least the next 5 days following surgery. You can transition if it is comfortable enough into thigh high compression stockings  for 1 month. You should then continue to wear your compression as much as possible.     No flying for 3 weeks post vein surgery.    For the first 2 weeks after surgery, avoid sitting and standing for long periods. Also, avoid lifting greater 20 lbs.     Take pain medication as prescribed. You are unable to drive until off of narcotics. Elevate the affected extremity as much as possible.    It is normal to have fluid drainage, some bruising, numbness and discoloration post operation, these are temporary.     Walking will help you recover more quickly.    It is ok to massage areas where the veins were taken out after dressings are removed.    You will receive pain medication after your surgery, take as needed. Take pain medication as prescribed. You are unable to drive until off of narcotics.    Ice use for the first 5-7 days is encouraged on areas of discomfort. 30 minutes on and 30 minutes off.     If you can take anti-inflammatories  like motrin, advil, or ibuprofen with every meal or evening. Dose 400-600 mg at each dose. It is good to have food or something in your stomach.    For questions after business hours please call Dr. Jeff Owusu at [phone] or for Dr. Bill Mills please call [phone].    For questions during business hours please call [phone]  8:00 am - 4:30 pm    Please do not hesitate to call us for questions or concerns.    After your stab phlebectomy we would like to see you two weeks after for follow up. If you don't already have a follow up appointment you should be seen at [facility] in 2 weeks.       Call your healthcare provider right away if you have any of the following:  Severe bleeding, redness, or drainage at the incision sites  Development of an ulcer (sore) at the incision sites  Numbness or tingling in legs or feet  Increasing leg pain or swelling  Fever, shaking, or chills  Chest pain or shortness of breath

## 2024-10-02 ENCOUNTER — ANCILLARY PROCEDURE (OUTPATIENT)
Dept: VASCULAR ULTRASOUND | Facility: CLINIC | Age: 59
End: 2024-10-02
Attending: SPECIALIST
Payer: COMMERCIAL

## 2024-10-02 ENCOUNTER — OFFICE VISIT (OUTPATIENT)
Dept: VASCULAR SURGERY | Facility: CLINIC | Age: 59
End: 2024-10-02
Attending: SPECIALIST
Payer: COMMERCIAL

## 2024-10-02 VITALS
OXYGEN SATURATION: 99 % | HEART RATE: 56 BPM | SYSTOLIC BLOOD PRESSURE: 125 MMHG | DIASTOLIC BLOOD PRESSURE: 76 MMHG | TEMPERATURE: 98.6 F

## 2024-10-02 DIAGNOSIS — I83.893 SYMPTOMATIC VARICOSE VEINS OF BOTH LOWER EXTREMITIES: Primary | ICD-10-CM

## 2024-10-02 DIAGNOSIS — I83.893 SYMPTOMATIC VARICOSE VEINS OF BOTH LOWER EXTREMITIES: ICD-10-CM

## 2024-10-02 DIAGNOSIS — I83.891 SYMPTOMATIC VARICOSE VEINS, RIGHT: ICD-10-CM

## 2024-10-02 PROCEDURE — 99214 OFFICE O/P EST MOD 30 MIN: CPT | Performed by: SPECIALIST

## 2024-10-02 PROCEDURE — 93971 EXTREMITY STUDY: CPT | Mod: RT

## 2024-10-02 PROCEDURE — 99243 OFF/OP CNSLTJ NEW/EST LOW 30: CPT | Performed by: SPECIALIST

## 2024-10-02 RX ORDER — ACETAMINOPHEN 325 MG/1
975 TABLET ORAL ONCE
OUTPATIENT
Start: 2024-10-02 | End: 2024-10-02

## 2024-10-02 ASSESSMENT — PAIN SCALES - GENERAL: PAINLEVEL: MILD PAIN (2)

## 2024-10-02 NOTE — PROGRESS NOTES
[facility] Vein Consult      Assessment:     1. varicose veins, bilateral   2. spider veins, bilateral   3. No major insuffiecies noted on US today.  Plan:     1. Treatment options of conservative therapy of stockings use, exercise, weight loss, elevating legs when possible.    2. Script for compression stockings 20-30 mm hg  3. Ultrasound to evaluate legs for incompetency of both deep and superficial system .   4. Surgical treatment   Stab avulsions of right lower extremities        Risks and benefits of surgical intervention including infection, burns, dvt, thrombophlebitis, not closing, recurrence, numbness and nerve injury and need for further intervention were all discused    5. Follow up:  for procedure if approved .   6. Call for any questions concerns or issues    Subjective:      Crystal Rodriguez is a 58 year old female  who was referred by Jacqui Blevins  for evaluation of varicose veins. Symptoms include pain, aching, fatigue, burning, edema, and dermatitis. Patient has history of leg selling, pain and vein issues that have progressed. Pain and symptoms have affected daily living and work activities needing medications. Here for evaluation today. Stocking use with compression stockings of 20-30 mm hg or greater for greater then 3 months    Allergies:Patient has no known allergies.    No past medical history on file.    No past surgical history on file.      Current Outpatient Medications:     acetaminophen (TYLENOL) 325 MG tablet, [ACETAMINOPHEN (TYLENOL) 325 MG TABLET] Take 650 mg by mouth., Disp: , Rfl:     ibuprofen (ADVIL,MOTRIN) 200 MG tablet, [IBUPROFEN (ADVIL,MOTRIN) 200 MG TABLET] Take 200-400 mg by mouth., Disp: , Rfl:     multivitamin/iron/folic acid (CERTAVITE-ANTIOXIDANT ORAL), [MULTIVITAMIN/IRON/FOLIC ACID (CERTAVITE-ANTIOXIDANT ORAL)] Take 1 tablet by mouth., Disp: , Rfl:     SUMAtriptan (IMITREX) 100 MG tablet, [SUMATRIPTAN (IMITREX) 100 MG TABLET] Take 100 mg by  mouth., Disp: , Rfl:      No family history on file.     reports that she quit smoking about 7 years ago. She has never used smokeless tobacco.      Review of Systems:    Pertinent items are noted in HPI.  Patient has symptomatic veins and changes of bilateral legs. These have progressed to the point of causing symptoms on a daily basis. This causes issues with daily activities and chores such as washing dishes, vacuuming, outdoor upkeep, and standing for long lengths of time       Objective:     Vitals:    10/02/24 1012   BP: 125/76   Pulse: 56   Temp: 98.6  F (37  C)   SpO2: 99%     There is no height or weight on file to calculate BMI.    EXAM:  GENERAL: This is a well-developed 58 year old female who appears her stated age  HEAD: normocephalic  HEENT: Pupils equal and reactive bilaterally  MOUTH: mucus membranes intact. Normal dentation  CARDIAC: RRR without murmur  CHEST/LUNG:  Clear to auscultation bilaterally  ABDOMEN: Soft, nontender, nondistended, no masses noted   NEUROLOGIC: Focally intact, nonfocal, alert and oriented x 3  INTEGUMENT: No open lesions or ulcers  VASCULAR: Pulses intact, symmetrical upper and lower extremities. There areskin changes consistent with chronic venous insufficiency. Varicose veins present in right greater saphenous distribution. Spider veins present bilateral.                            Patient reported symptoms  Heaviness: some of the time  Achiness: most of the time (after walking)  Swelling: none of the time  Throbbing: some of the time  Itching: none of the time  Impact on work/activity: none of the time    Imaging:    Reviewed us showing no major insuffiencies of right leg.     Exam Information    Exam Date Exam Time Accession # Performing Department Results    10/2/24 10:56 AM [id number] [facility]      PACS Images     Show images for US Venous Competency Right     Study Result    Narrative & Impression   RIGHT Venous  Insufficiency Ultrasound (Date: 10/02/24)    RIGHT Lower Extremity          Indication:  Surveillance of right leg symptomatic varicose veins, leg pain and swelling     Previous: 8/4/21     Patient History: Swelling, Stasis, and Vein Ablation     Presenting Symptoms:  Swelling, Varicose Veins, Pain, and Stasis     Technique:   Supine and Reverse Trendelenburg Ultrasound of the Deep and Superficial Veins with Valsalva and Compression Augmentation Maneuvers. Duplex Imaging is performed utilizing gray-scale, Two-dimensional images, color-flow imaging, Doppler waveform analysis, and Spectral doppler imaging done with provacative maneuvers.      Incompetency Criteria:  Deep vein reflux reported when greater than 1000 msec flow reversal. Superficial vein reflux reported when equal to or greater than 600 msec flow reversal.  vein reflux reported as greater than 350 msec flow reversal.      Right  Leg Deep Veins    CFV SFJ DFV PROX FV   PROX FV MID FV DIST POP V. PERON.   V. PTV'S   Compressibility  (FC,PC,NC) FC FC FC FC FC FC FC FC FC   Reflux -   - - - - -   -         Right Leg Superficial Veins  Location SFJ PROX THIGH MID THIGH KNEE MID CALF   GSV (mm) 4 RFA RFA RFA 2   Reflux - - - - -   AASV (mm) 3           Reflux -              Location SPJ PROX CALF MID CALF   SSV (mm) 3 3 3   Reflux - - -         Comments: No incompetent  veins visualized.      Right VV Branch posterior knee 5 mm incompetent         Impression:       Right Deep Vein Findings: Patent deep venous system with no evidence of DVT and no reflux        Superficial Vein Findings:      Right Greater Saphenous Vein:  Competent at the saphenofemoral junction and mid calf.  The vein is not visualized from the proximal thigh to the knee. .     Right Small Saphenous Vein: Patent Small Saphenous Vein without evidence of reflux.     Perforating and Accessory Veins: N/A     Reference:     Compressibility: FC= Fully compressible, PC= Partially  compressible, NC= Non-compressible, NV= Not Visualized     Reflux: (+) Incompetent  (-) Competent, (NV) = Not Visualized     Interpretation criteria:          Duration of Retrograde flow (milliseconds)  Category Deep Veins Superficial Veins  veins   Competent < 1000ms < 500ms < 350ms   Incompetent > 1000ms > 500ms > 350ms              Jeff Owusu MD  General Surgery [phone]  Vascular Surgery [phone]

## 2024-10-02 NOTE — PROGRESS NOTES
Vascular Clinic Rooming Questions        Patient is here for a 2 year follow up  to discuss Varicose Veins. The patient does not wear compressions. The patient has varicose veins that are problematic in right legs. Patient states their varicose veins are bothersome when standing, sitting, working, and household chores.     /76   Pulse 56   Temp 98.6  F (37  C)   SpO2 99%     The provider has been notified that the patient has concerns wondering if surgery is needed so the veins don't increase in size.     Questions patient would like addressed today are: N/A.    Refills are needed: No    Has homecare services and agency name:  No

## 2024-10-15 ENCOUNTER — TELEPHONE (OUTPATIENT)
Dept: VASCULAR SURGERY | Facility: CLINIC | Age: 59
End: 2024-10-15
Payer: COMMERCIAL

## 2024-10-15 NOTE — TELEPHONE ENCOUNTER
Venous Closure Prior Authorization Request    Dr. Jeff Owusu NPI: [id number]  Vascular NPI: [id number] Tax ID: [id number]  MSC NPI: [id number] Tax ID: [id number]    Payor:  [employer]  Procedure: Stab Phlebectomy of RLE  CPTs: [id number]  Case/Ref #: [id number]  Clinicals Provided: Face Sheet, Office notes, and US venous competency  Submitted via: [email or web address] Secure Email: [email or web address]

## 2024-10-21 NOTE — TELEPHONE ENCOUNTER
PA approved.    Payor: [employer]  Procedure: Stab Phlebectomy of RLE  CPTs approved: [id number]  Effective dates: 10/15/2024-12/31/2024  PA reference/auth #: [id number]  Need 2 days for procedure?: No

## 2024-11-14 NOTE — TELEPHONE ENCOUNTER
Pt called to notify clinic that she would like to wait on scheduling her stab phlebectomy.  She would like to schedule in February 2025 but is not able to determine ane exact date at this time.      Prior authorization will need to be extended/resubmitted.  Pt indicated that she is not changing insurances in 2025.  She will call clinic when ready to schedule surgery date.

## 2025-02-18 ENCOUNTER — PATIENT OUTREACH (OUTPATIENT)
Dept: CARE COORDINATION | Facility: CLINIC | Age: 60
End: 2025-02-18
Payer: COMMERCIAL

## 2025-04-06 ENCOUNTER — HEALTH MAINTENANCE LETTER (OUTPATIENT)
Age: 60
End: 2025-04-06

## 2025-05-05 NOTE — PATIENT INSTRUCTIONS
Crystal    Thank you for entrusting your care with us at the [facility].      We are prescribing some compression stockings for you. I have included different suppliers that should help you get measured and fitting to ensure proper fitting socks. You should wear these stockings as much as you can. It is especially important to wear them with long periods of standing, sitting, long car rides or if you will be flying. Compression socks should get refilled every 4-6 months. They do not need to be worn at night while in bed. It is recommended to wear compression level of 20-30mmhg or higher from toes to knees.                   Varicose Veins    Varicose veins are twisted, enlarged veins near the surface of the skin. They develop most often in the legs and ankles.    Some people may be more likely than others to get varicose veins because of several things. These include aging, pregnancy, being overweight, or because a parent has them. Standing or sitting for long periods of time can also increase risk of varicose veins.    Follow-up care is a key part of your treatment and safety. Be sure to make and go to all appointments, and call your doctor if you are having problems. It's also a good idea to know your test results and keep a list of the medicines you take.      Varicose veins are caused by weakened valves and veins in your legs. Normally, one-way valves in your veins keep blood flowing from your legs up toward your heart. When these valves don't work as they should, blood collects in your legs, and pressure builds up. The veins become weak, large, and twisted.    How can you care for yourself at home?  Wear compression stockings during the day to help relieve symptoms and improve blood flow. Talk to your doctor about which ones to get and where to get them.  Prop up your legs at or above the level of your heart when possible. Try to do this for about 30 minutes at a time, about 3 times a  day. This helps keep the blood from pooling in your lower legs and improves blood flow to the rest of your body.  Avoid sitting and standing for long periods. This puts added stress on your veins.  Stay at a healthy weight. Lose weight if you need to.  Try to take several short walks every day.  Get at least 30 minutes of exercise on most days of the week. Walking is a good choice. You also may want to do other activities, such as running, swimming, cycling, or playing tennis or team sports.  Do calf muscle exercises every day. When you are sitting down, rotate your feet at the ankles in both directions, making small circles. Extend your legs, and point and flex your feet.  Avoid crossing your legs at the knees when sitting.  Take good care of your skin. Treat cuts and scrapes on your legs right away. Keep your legs clean and moisturized to prevent drying and cracking. Prevent sunburns.  Do not smoke. Smoking can make varicose veins worse. If you need help quitting, talk to your doctor about stop-smoking programs and medicines. These can increase your chances of quitting for good.  If you bump your leg so hard that you know it is likely to bruise, prop up your leg and apply ice or cold packs right away. Apply the ice or cold pack for 10 to 20 minutes, 3 or more times a day. Put a thin cloth between the ice and your skin.  If you cut or scratch the skin over a vein, it may bleed a lot. Prop up your leg and apply firm pressure for a full 15 minutes.  If you have a blood clot in a varicose vein, you may have tenderness and swelling over the vein. The vein may feel firm. Be sure to call your doctor right away if you have these symptoms. If your doctor has told you how to care for the clot, follow the instructions.     Care may include the following:    Prop up your leg and apply a damp cloth that is warm or cool.  Ask your doctor if you can take an over-the-counter pain medicine, such as acetaminophen (Tylenol),  ibuprofen (Advil, Motrin), or naproxen (Aleve). Be safe with medicines. Read and follow all instructions on the label.    When should you call for help?     Call 911 anytime you think you may need emergency care. For example, call if:    You have sudden chest pain and shortness of breath, or you cough up blood.    Call your doctor now or seek immediate medical care if:    You have signs of a blood clot in your leg (called a deep vein thrombosis), such as:  Pain in your calf, back of the knee, thigh, or groin.  Swelling in the leg or groin.  A color change on the leg or groin. The skin may be reddish or purplish, depending on your usual skin color.  A varicose vein begins to bleed and you cannot stop it.  You have a tender lump in your leg.  You get an open sore.    Watch closely for changes in your health, and be sure to contact your doctor if:    Your varicose vein symptoms do not improve with home treatment.    Current as of: December 19, 2022  Author: Healthwise Staff  Medical Review:Philippe De León MD - Family Medicine & FARZAD Harmon MD - Internal Medicine & Simone Snider MD - Family Medicine & Urban Marina MD - Family Medicine & Hunter Tran MD - Diagnostic Radiology    Please bring your compression prescription to a home medical supply store. Here is a list of locations but not limited to.     [facility]  [facility]  [address] [place] [address]  Phone: [phone]  Fax: [phone] [facility] Bld[facility]  [address] [place] [address]  Phone: [phone]  Fax: [phone]   [facility]  [address] [place] [address]  Phone: [phone]  Fax: [phone] [facility]  [address]  [address] [place] [address]  Phone: [phone]  Fax: [phone]  "  [facility]  [address] [place] [address]  Phone: [phone] [facility]   [address] ([facility] [place] [address]  Phone: [phone]   [facility]  [address] [place] [address]      Phone: [phone]  Fax: [phone] [place]  [address] [place] [address]      Phone: [phone]  Fax: [phone]     [facility] [email or web address]  [address], [place] [address]  [phone]    [facility]  [email or web address]  [address] [place] [address]  Phone: [phone]     [address] [place] [address]  Phone: [phone]    [address] [place] [address]  Phone: [phone] [address], [place] [address]  Phone: [phone] [address], [place] [address]  Phone: [phone]    [facility] [email or web address]  [address] [place] [address]  Phone: [phone]    [address] [place] [address]  Phone: [phone]    [facility], [place] [address]  Phone: [phone]    Bobbi [facility]  [email or web address]  [phone]          Surgery for Varicose Veins  If you have large varicose veins, surgery may be the best choice. But it will not prevent new varicose veins from forming. Surgery is most often done in a hospital or surgery center on an outpatient basis.  Varicose vein surgery    What is microphlebectomy?  Phlebectomy (say \"nqpo-NTT-lqr-massiel\") is a procedure used to remove varicose veins. These are twisted and enlarged veins near the surface of the skin. The procedure is also called microphlebectomy, ambulatory phlebectomy or stab avulsion.    How is the procedure done?  The procedure is usually done in your doctor's office. You will get medicine to numb the area.  Your doctor will make several tiny cuts (incisions) in the skin. " The varicose veins will be removed through the cuts.  You most likely will not need stitches to close the cuts.    What can you expect after the procedure?  Your doctor may wrap your leg in a bandage. You may also wear compression stockings. Your doctor will tell you how long to wear them.  You can go home the same day. You will probably be able to do your usual activities the next day. You may have a little bruising and numbness.  Follow-up care is a key part of your treatment and safety. Be sure to make and go to all appointments, and call your doctor if you are having problems. It's also a good idea to know your test results and keep a list of the medicines you take.    Current as of: December 19, 2022  Author: [employer]   Medical Review:Yvrose Kennedy MD - Family Medicine & Simone Snider MD - Family Medicine & Cesar Valenzuela MD - Vascular Surgery    Crystal,  Your visit to [facility] for your surgery or procedure is coming soon and we look forward to seeing you! This friendly reminder and pre-procedure checklist will help to ensure your procedure/surgery goes smoothly and meets your expectations. At [facility], our goal is to provide you with a great patient experience and to deliver genuine, professional care to every patient.   Please complete all the steps in advance of your visit. If you have any questions about the items listed below, please give our office a call. We can be reached at [phone] or visit our website at [email or web address]  for more information.     Procedure: Phlebectomy of right lower extremity  Surgeon: Dr. Jeff Owusu  Procedure Date :  TBA  Procedure Location: [facility]:  [address] [place] [address] [address] [place] [address] (Fax: [phone])   Please enter through the main entrance. Take the elevators to the 3rd floor. Check in at  of [address].     Admission Type: Outpatient  Post  Operative Appointment with Dr Jeff Owusu: 2 weeks post    IF YOU NEED TO RESCHEDULE OR CANCEL YOUR PROCEDURE FOR ANY REASON PLEASE CALL THE CLINIC AS SOON AS POSSIBLE -[phone].    Complete the following checklist before your procedure/surgery    [] Contact your insurance regarding coverage. We will do a prior authorization, but please be aware this doesn't mean you are covered at 100%. If your insurance has changed please notify us.  Please check with your insurance for coverage and your out of pocket.  Stab phlebectomy outpatient procedure code: 15951  Stab phlebectomy in clinic procedure code: 89193  Vein ligation procedure code: 61379  If you would like a Good Jillian Estimate for your upcoming service/procedure contact Cost of Care Estimates at [phone], advocates are available Monday - Friday 8am - 5pm.  You may also submit a request online through your [employer] account.  If your procedure is at [facility] please contact the numbers below for Cost of Care Estimates.   - Facility Charge: [phone]    Anesthesiology charge:  [phone]    For all self-pay, estimate, or anesthesia billing questions at [facility], the contact information is below:  Who to contact: Zayda MERCADO  [facility] number: [phone]  Prepay number: [phone]    [] Pre-Operative Physical   You will need a Pre-Op physical within 30 days of surgery date from your primary provider.     [] Pre-Operative Medication Instructions  Contact your prescribing provider for instructions before discontinuing any medications including anticoagulants. (Examples: Coumadin, Heparin, Xarelto, Eliquis, Plavix, Pradaxa, Effiient, Briliant) We would like you stop them 3-5 days before surgery, depending on the medicaion. HOWEVER, please follow the advice of your primary care provider. Most surgeons will have you remain on your aspirin.      Not Applicable    If you take these diabetic  medications, please discuss with your primary doctor and follow the hold instructions:   Hold seven (7) days prior for once weekly injectable doses [semaglutide (Ozempic, Wegovy), dulaglutide (Trulicity), exenatide ER (Bydureon), tirzepatide (Mounjaro)]  Hold the day before and day of for once daily injectable GLP-1 agonists [exenatide (Byetta), liraglutide (Saxenda, Victoza)]  Hold seven (7) days for oral semaglutide (Rybelsus)     Tell your healthcare provider about all medicines you take and any allergies you may have.      [] Fasting Requirements  Nothing to eat for 8 hours before surgery unless instructed differently by the surgery nurse.   You may have clear liquids up to two hours before your arrival time (coffee, tea, water, or Gatorade. No cream or milk)  If your primary care provider has instructed you to continue oral medications, you may take them on the morning of surgery with a small sip of water.    No alcohol or smoking after 12:00am the day of surgery.    [] You will receive a call from a surgery nurse 3-5 days prior to surgery.     []DO NOT BRING FMLA WITH TO SURGERY.  These should be sent to the provider's office by fax to [phone]   Use Chlorhexidine Gluconate 4% soap to help prevent surgical site infections    You play an important part in helping to prevent a surgical site infection. Let s review what you will need to do.    Chlorhexidine Gluconate 4% is a powerful antiseptic that will help make sure your skin is free of germs. It looks and feels just like liquid soap and should be used liked a shower gel.    **[facility] patients can receive free Chlorhexidine Gluconate 4% soap for surgery at any outpatient [facility] pharmacy. You can also buy this over the counter at any pharmacy.**    Do not shave within 12 inches of your incision (surgical cut) area for at least 3 days before surgery. Shaving can make small cuts in the skin. This puts you at a higher risk of infection.    Items you will  need for each shower:   1 newly washed towel   4 ounces of one of the above soaps   Clean pajamas or clothes to change into    Follow these steps the evening before surgery and the morning of surgery.  Remove all body piercings and jewelry, and leave them off until after your surgery.  Wash your hair and body with your regular shampoo and soap. Make sure you rinse the shampoo and soap from your hair and body.  Using clean hands, apply about 2 ounces of soap gently on your skin from your ear lobes to your toes. You don t need to scrub your skin, but make sure you liberally wash the area where your surgery will be done. Use on your groin area last. Do not use this soap on your face or head. If you get any soap in your eyes, ears or mouth, rinse right away. It is very important to let the soap stay on your skin for at least 1 minute.  Repeat step 3.   Rinse well and dry off using a clean towel. If you feel any tingling, itching or other irritation, rinse right away. It is normal to feel some coolness on the skin after using the antiseptic soap. Your skin may feel a bit dry after the shower, but do not use any lotions, creams or moisturizers. Do not use hair spray or other products in your hair. Also, do not wash with your regular soap after using this.  Dress in freshly washed clothes or pajamas. Use fresh pillowcases and sheets on your bed.  Repeat these steps the morning of surgery.    If you are allergic or sensitive to Chlorhexidine Gluconate, use an antibacterial soap instead, following the same steps.    If you cannot use the shower, wash yourself with this soap at the sink. Make sure the sink is clean before you begin, and do the best you can to clean your entire body.       Discharge Instructions for Varicose Vein Surgery    You had a procedure in which your varicose veins were surgically removed. Here s what you can do following surgery to help with your recovery.  Home care Recommendations for taking care of  yourself at home include the following:    Ask someone to help you run errands or do household chores for a few days after surgery.    Keep your legs raised when you re sitting or lying down.    Begin a regular walking program, starting the day after surgery. Just walk for a few minutes at first; then work up to 5 minutes at a time. Gradually increase to 15 to 20 minutes at a time, 2 to 3 times a day.      Post Op Instructions:    You will be discharged with compression wraps from your toe to thigh and will wear the wraps for 5-7 days. You can remove the wraps to shower after 48 hours. Please ensure you continue wrapping your leg with the compression wrap for at least the next 5 days following surgery. You can transition if it is comfortable enough into thigh high compression stockings  for 1 month. You should then continue to wear your compression as much as possible.     No flying for 3 weeks post vein surgery.    For the first 2 weeks after surgery, avoid sitting and standing for long periods. Also, avoid lifting greater 20 lbs.     Elevate the affected extremity as much as possible.    It is normal to have fluid drainage, some bruising, numbness and discoloration post operation, these are temporary.     Walking will help you recover more quickly.    It is ok to massage areas where the veins were taken out after dressings are removed.    You will receive pain medication after your surgery. Take pain medication as prescribed as needed. You are unable to drive until off of narcotics.    Ice use for the first 5-7 days is encouraged on areas of discomfort. 30 minutes on and 30 minutes off.     If you can take anti-inflammatories like motrin, advil, or ibuprofen with every meal or evening. Dose 400-600 mg at each dose. It is good to have food or something in your stomach.    For questions after business hours please call Dr. Jeff Owusu at [phone] or for Dr. Bill Mills please call [phone].    For  questions during business hours please call [phone]  8:00 am - 4:30 pm    Please do not hesitate to call us for questions or concerns.    After your stab phlebectomy we would like to see you two weeks after for follow up. If you don't already have a follow up appointment you should be seen at [facility] in 2 weeks.       Call your healthcare provider right away if you have any of the following:  Severe bleeding, redness, or drainage at the incision sites  Development of an ulcer (sore) at the incision sites  Numbness or tingling in legs or feet  Increasing leg pain or swelling  Fever, shaking, or chills  Chest pain or shortness of breath      Sclerotherapy Information    [facility] Office-  Dr. Teresa Kerr   Phone: [phone]    [place] Location  [address] [place], [place], [address]    [place] Location  [facility]   [address]  [place], [place], [address]        Sclerotherapy: Before Your Procedure  What is sclerotherapy?  Sclerotherapy is a treatment to get rid of varicose veins and spider veins. A chemical called a sclerosant is injected into the vein. This causes the vein to close.  The procedure may cause some pain. The chemical may cause burning or cramping for a few minutes at the injection site.  The procedure may take about 15 to 60 minutes. It depends on how many veins are treated and how big they are. You should be able to walk on your own after the treatment.  How do you prepare for the procedure?  Procedures can be stressful. This information will help you understand what you can expect. And it will help you safely prepare for your procedure.  Preparing for the procedure    Plan to wear loose, comfortable clothes. You may want to bring shorts to wear during the procedure.     Don't use any lotion on your legs before the procedure.     You might want someone to drive you home after the procedure.     Understand  "exactly what procedure is planned, along with the risks, benefits, and other options.     Tell your doctor ALL the medicines, vitamins, supplements, and herbal remedies you take. Some may increase the risk of problems during your procedure. Your doctor will tell you if you should stop taking any of them before the procedure and how soon to do it.     If you take a medicine that prevents blood clots, your doctor may tell you to stop taking it before your procedure. Or your doctor may tell you to keep taking it. (These medicines include aspirin and other blood thinners.) Make sure that you understand exactly what your doctor wants you to do.     Make sure your doctor and the hospital have a copy of your advance directive. If you don't have one, you may want to prepare one. It lets others know your health care wishes. It's a good thing to have before any type of surgery or procedure.   What happens on the day of the procedure?    Take a bath or shower before you come in for your procedure. Do not apply lotion to your legs.     Do not shave your legs.   At the doctor's office or clinic    Bring a picture ID.     The procedure may take about 15 to 60 minutes. How long it takes depends on how many veins are treated and how big they are.   When should you call your doctor?   You have questions or concerns.     You don't understand how to prepare for your procedure.     You become ill before the procedure (such as fever, flu, or a cold).     You need to reschedule or have changed your mind about having the procedure.   Where can you learn more?  Go to https://www.[facility].net/patiented  Enter C494 in the search box to learn more about \"Sclerotherapy: Before Your Procedure.\"  Current as of: August 6, 2023               Content Version: 14.0    7324-4894 Healthwise, [employer].   Care instructions adapted under license by your healthcare professional. If you have questions about a medical condition or this instruction, " always ask your healthcare professional. Healthwise, [employer] disclaims any warranty or liability for your use of this information.    Sclerotherapy: What to Expect at Home  Your Recovery  The doctor injected a special chemical (sclerosant) into a vein. This chemical damaged and scarred the inside lining of the vein. This caused the vein to close.  After sclerotherapy to treat varicose veins or spider veins, your recovery will be short. You should be able to walk right away. But avoid strenuous activity until your doctor says it's okay.  You may wear compression bandages or stockings after the procedure. These will help keep pressure on the veins so the blood doesn't return when you stand up. Your doctor will tell you how long you need to wear the stockings.  This care sheet gives you a general idea about how long it will take for you to recover. But each person recovers at a different pace. Follow the steps below to feel better as quickly as possible.  How can you care for yourself at home?  Activity    Rest when you feel tired. Getting enough sleep will help you recover.     Try to do low-impact exercise each day. This includes walking and bicycle riding.     Avoid strenuous activities, such as jogging, weight lifting, or aerobic exercise, until your doctor says it is okay.     Do not stand or sit for long periods.   Medicine    Your doctor will tell you if and when you can restart your medicines. Your doctor will also give you instructions about taking any new medicines.     If you stopped taking aspirin or some other blood thinner, your doctor will tell you when to start taking it again.   Other instructions    Wear compression bandages or stockings if your doctor recommends it. Your doctor will tell you how long to wear them.     You may shower after 48 hours after the procedure. Don't swim or take a bath until your doctor tells you it is okay.     Avoid exposing your legs to the sun for at least the first  "2 weeks after the procedure.   Follow-up care is a key part of your treatment and safety. Be sure to make and go to all appointments, and call your doctor if you are having problems. It's also a good idea to know your test results and keep a list of the medicines you take.  When should you call for help?   Call 911 anytime you think you may need emergency care. For example, call if:    You passed out (lost consciousness).     You have severe trouble breathing.     You have sudden chest pain and shortness of breath, or you cough up blood.   Call your doctor now or seek immediate medical care if:    You get open sores on your legs where the chemical was injected.     You have symptoms of a blood clot in your leg (called deep vein thrombosis), such as:  Pain in the calf, back of the knee, thigh, or groin.  Swelling in the leg or groin.  A color change on the leg or groin. The skin may be reddish or purplish, depending on your usual skin color.     You have problems with your vision or balance.     You have pain that does not get better after you take pain medicine.     You have signs of infection, such as:  Increased pain, swelling, warmth, or redness.  Red streaks leading from the area of injection.  Pus draining from the area of injection.  A fever.   Watch closely for any changes in your health, and be sure to contact your doctor if you have any problems.  Where can you learn more?  Go to [email or web address].net/patiented  Enter W884 in the search box to learn more about \"Sclerotherapy: What to Expect at Home.\"  Current as of: August 6, 2023               Content Version: 14.0    [id number] [employer]   Care instructions adapted under license by your healthcare professional. If you have questions about a medical condition or this instruction, always ask your healthcare professional. [employer] disclaims any warranty or liability for your use of this information.                      "

## 2025-05-06 ENCOUNTER — OFFICE VISIT (OUTPATIENT)
Dept: VASCULAR SURGERY | Facility: CLINIC | Age: 60
End: 2025-05-06
Attending: SPECIALIST
Payer: COMMERCIAL

## 2025-05-06 VITALS
OXYGEN SATURATION: 98 % | DIASTOLIC BLOOD PRESSURE: 57 MMHG | HEART RATE: 59 BPM | SYSTOLIC BLOOD PRESSURE: 99 MMHG | TEMPERATURE: 97.8 F

## 2025-05-06 DIAGNOSIS — I83.893 SYMPTOMATIC VARICOSE VEINS OF BOTH LOWER EXTREMITIES: Primary | ICD-10-CM

## 2025-05-06 DIAGNOSIS — I83.91 SPIDER VEIN OF RIGHT LOWER EXTREMITY: ICD-10-CM

## 2025-05-06 PROCEDURE — 1125F AMNT PAIN NOTED PAIN PRSNT: CPT | Performed by: SPECIALIST

## 2025-05-06 PROCEDURE — 3074F SYST BP LT 130 MM HG: CPT | Performed by: SPECIALIST

## 2025-05-06 PROCEDURE — 99214 OFFICE O/P EST MOD 30 MIN: CPT | Performed by: SPECIALIST

## 2025-05-06 PROCEDURE — 3078F DIAST BP <80 MM HG: CPT | Performed by: SPECIALIST

## 2025-05-06 PROCEDURE — 99213 OFFICE O/P EST LOW 20 MIN: CPT | Performed by: SPECIALIST

## 2025-05-06 ASSESSMENT — PAIN SCALES - GENERAL: PAINLEVEL_OUTOF10: MILD PAIN (2)

## 2025-05-06 NOTE — PROGRESS NOTES
Vascular Clinic Rooming Questions      Patient is here for  follow up  for Varicose Veins. The patient does  wear compression stockings. The patient has varicose veins that are problematic in right legs. Patient states their varicose veins are bothersome when standing, sitting, working, and household chores. The patient has been using medications for their leg discomfort    BP 99/57 (BP Location: Left arm)   Pulse 59   Temp 97.8  F (36.6  C)   SpO2 98%     The provider has been notified that the patient has no concerns.     Questions patient would like addressed today are: N/A.    Refills are needed: N/A    Has homecare services and agency name:  No

## 2025-05-06 NOTE — PROGRESS NOTES
Right lower extremity varicose veins increased now on shin with spider veins. Remains on calf.Reviewed right lower extremity stab phlebectomy will be moving forward in 10/25. [employer] has approved already but if expires may need to repeat mapping. Will need physical and continued compression. Reviewed AVS.We also reviewed sclerotherapy and cosmetic treatment will need to be done to treat spiders.

## 2025-05-19 ENCOUNTER — LAB REQUISITION (OUTPATIENT)
Dept: LAB | Facility: CLINIC | Age: 60
End: 2025-05-19

## 2025-05-19 DIAGNOSIS — K13.70 UNSPECIFIED LESIONS OF ORAL MUCOSA: ICD-10-CM

## 2025-05-19 LAB
HSV1 IGG SERPL QL IA: >62.2 INDEX
HSV1 IGG SERPL QL IA: ABNORMAL
HSV2 IGG SERPL QL IA: 0.05 INDEX
HSV2 IGG SERPL QL IA: ABNORMAL

## 2025-05-19 PROCEDURE — 86696 HERPES SIMPLEX TYPE 2 TEST: CPT

## 2025-05-20 NOTE — PROGRESS NOTES
Follow Up: Varicose Veins/ Venous Insufficiency    Crystal Rodriguez is a 59 year old  female here for followup. She has worn stockings now for 6  months. I saw them previously in October 2024.  Continued progression of disease and symptoms and issues; reviewed ultrasound results. Patient has ongoing symptoms with pain and swelling needing intervention with pain meds secondary to interfering with daily activities and work. This now inhibits daily chores and activities.     Allergies: No Known Allergies     Past Medical History: History reviewed. No pertinent past medical history.     Past Social History: History reviewed. No pertinent surgical history.     CURRENT MEDS:  Current Outpatient Medications   Medication Sig Dispense Refill    acetaminophen (TYLENOL) 325 MG tablet [ACETAMINOPHEN (TYLENOL) 325 MG TABLET] Take 650 mg by mouth.      ibuprofen (ADVIL,MOTRIN) 200 MG tablet [IBUPROFEN (ADVIL,MOTRIN) 200 MG TABLET] Take 200-400 mg by mouth.      multivitamin/iron/folic acid (CERTAVITE-ANTIOXIDANT ORAL) [MULTIVITAMIN/IRON/FOLIC ACID (CERTAVITE-ANTIOXIDANT ORAL)] Take 1 tablet by mouth.      SUMAtriptan (IMITREX) 100 MG tablet [SUMATRIPTAN (IMITREX) 100 MG TABLET] Take 100 mg by mouth.       No current facility-administered medications for this visit.       History reviewed. No pertinent family history.     reports that she quit smoking about 8 years ago. Her smoking use included cigarettes. She has never used smokeless tobacco.    Review of Systems:  Negative except progression of disease and issues while wearing compression and conservative management.   Patient has symptomatic veins and changes of bilateral legs. These have progressed to the point of causing symptoms on a daily basis. This causes issues with daily activities and chores such as washing dishes, vacuuming, mowing lawn, outdoor upkeep, and standing for long lengths of time. She has worn compression now for greater than 3 months, worked on an  exercise and weight loss program and continues to have symptoms.     OBJECTIVE:  Vitals:    05/06/25 0807   BP: 99/57   BP Location: Left arm   Pulse: 59   Temp: 97.8  F (36.6  C)   SpO2: 98%     There is no height or weight on file to calculate BMI.    EXAM:  GENERAL: This is a well-developed 59 year old female who appears her stated age  HEAD: normocephalic  HEENT: Pupils equal and reactive bilaterally  CARDIAC: RRR without murmur  CHEST/LUNG:  Clear to auscultation  ABDOMEN: Soft, nontender, nondistended, no masses    NEUROLOGIC: Focally intact, nonfocal  VASCULAR: Pulses intact, symmetrical upper and lower extremities. Varicose veins, Spider veins, Thrombophlebitis, and Chronic venous stasis changes, bilateral    Patient presents with mild, bilateral  lower extremity secondary lymphedema.     Patient requires a standard-fit knee high compression stocking      Side:: Right  VCSS  PAIN:: Moderate: Daily moderate activity limitation, occasional pain medication  Varicose Veins:: Mild: Few scattered  Venous Edema:: Absent: None  Skin Pigmentation:: Absent: None  Inflamation:: Absent: None  Induration:: Absent: None  Number of active ulcers:: 0  Active ulcer duration:: None  Active ulcer diameter:: None  Compression Therapy:: Full compliance stockings + elevation  VCSS Score:: 6  CEAP:: Simple varicose veins only  Patient reported symptoms  Vein Appearance: Slightly noticeable  Heaviness: some of the time  Achiness: most of the time  Swelling: none of the time  Throbbing: some of the time  Itching: none of the time  Impact on work/activity: Mildly reduced work/activity    Imaging    Reviewed and my interpretation of the US is          Exam Information    Exam Date Exam Time Accession # Performing Department Results    10/2/24 10:56 AM [id number] [facility] [place]      PACS Images     Show images for US Venous Competency Right     Study Result    Narrative & Impression    RIGHT Venous Insufficiency Ultrasound (Date: 10/02/24)    RIGHT Lower Extremity          Indication:  Surveillance of right leg symptomatic varicose veins, leg pain and swelling     Previous: 8/4/21     Patient History: Swelling, Stasis, and Vein Ablation     Presenting Symptoms:  Swelling, Varicose Veins, Pain, and Stasis     Technique:   Supine and Reverse Trendelenburg Ultrasound of the Deep and Superficial Veins with Valsalva and Compression Augmentation Maneuvers. Duplex Imaging is performed utilizing gray-scale, Two-dimensional images, color-flow imaging, Doppler waveform analysis, and Spectral doppler imaging done with provacative maneuvers.      Incompetency Criteria:  Deep vein reflux reported when greater than 1000 msec flow reversal. Superficial vein reflux reported when equal to or greater than 600 msec flow reversal.  vein reflux reported as greater than 350 msec flow reversal.      Right  Leg Deep Veins    CFV SFJ DFV PROX FV   PROX FV MID FV DIST POP V. PERON.   V. PTV'S   Compressibility  (FC,PC,NC) FC FC FC FC FC FC FC FC FC   Reflux -   - - - - -   -         Right Leg Superficial Veins  Location SFJ PROX THIGH MID THIGH KNEE MID CALF   GSV (mm) 4 RFA RFA RFA 2   Reflux - - - - -   AASV (mm) 3           Reflux -              Location SPJ PROX CALF MID CALF   SSV (mm) 3 3 3   Reflux - - -         Comments: No incompetent  veins visualized.      Right VV Branch posterior knee 5 mm incompetent         Impression:       Right Deep Vein Findings: Patent deep venous system with no evidence of DVT and no reflux        Superficial Vein Findings:      Right Greater Saphenous Vein: Competent at the saphenofemoral junction and mid calf.  The vein is not visualized from the proximal thigh to the knee..     Right Small Saphenous Vein: Patent Small Saphenous Vein without evidence of reflux.     Perforating and Accessory Veins: N/A     Reference:     Compressibility: FC= Fully compressible,  PC= Partially compressible, NC= Non-compressible, NV= Not Visualized     Reflux: (+) Incompetent  (-) Competent, (NV) = Not Visualized     Interpretation criteria:          Duration of Retrograde flow (milliseconds)  Category Deep Veins Superficial Veins  veins   Competent < 1000ms < 500ms < 350ms   Incompetent > 1000ms > 500ms > 350ms              Assessment/Plan:    Symptomatic varicose veins both Fas[place] for her on the right.  She is known to closure on that and this in the past and that has helped some but still remains of the veins which are bothersome for her and her shins and calf regions she returns for follow-up visit we discussed options with her I think at this time point stab phlebectomy versus sclerotherapy versus conservative management explained both issues and options at this time point she like to proceed with stab phlebectomy should be a good candidate to do so be same-day surgery she delayed up to 3 weeks afterwards risk anesthesia fraction bleeding clotting issues numbness and possibility of return of vessels in the future were all discussed she would like to wait till end of the summer or fall to do so.  We can submit this to insurance but her ultrasound was done last October so they may require  But hopefully will except her workup which shows insufficiency and breakdown of the branches which are the ones that are causing her troubles.      Jeff Owusu MD  General Surgery [phone]  Vascular Surgery [phone]

## 2025-07-15 ENCOUNTER — TELEPHONE (OUTPATIENT)
Dept: VASCULAR SURGERY | Facility: CLINIC | Age: 60
End: 2025-07-15
Payer: COMMERCIAL

## 2025-07-15 PROBLEM — I83.891 SYMPTOMATIC VARICOSE VEINS, RIGHT: Status: ACTIVE | Noted: 2024-10-02

## 2025-07-15 NOTE — TELEPHONE ENCOUNTER
Reviewed Blood Thinners and plan for holding, continuing and/or bridging: NA    Reviewed Diabetic medications that are GLP-1 agonists: NA  Please discuss with your primary doctor and follow the hold instructions:   []  Hold seven (7) days prior for once weekly injectable doses [semaglutide (Ozempic, Wegovy), dulaglutide (Trulicity), exenatide ER (Bydureon), tirzepatide (Mounjaro)]  []  Hold the day before and day of for once daily injectable GLP-1 agonists [exenatide (Byetta), liraglutide (Saxenda, Victoza)]  []  Hold seven (7) days for oral semaglutide (Rybelsus)

## 2025-07-15 NOTE — TELEPHONE ENCOUNTER
Surgery Scheduled    Discussed surgery plans/instructions. Will send surgery packet once medications have been reviewed.    Surgery/Procedure: STAB PHLEBECTOMY, VARICOSE VEIN (Right)     Special Equipment: CPT 51640    Location: [facility] Center:  [address] [place] [address] [address] [place] [address] (Fax: [phone])   Please enter through the main entrance. Take the elevators to the 3rd floor. Check in at  of [address].     Date: 10/23/2025    Time: 8:00AM    Admission Type: Outpatient    Surgeon: Dr. Jeff Owusu    OR Confirmed & :  Yes sent to [facility] on 7/15/2025    IR Tech Needed:  No     Entered on provider calendar:  Yes    Post Op: See appt desk    Wound Vac Needed: No    Home Care Needed: No    Ultrasound Scheduled: Not needed    Reps Contacted: Not needed    Blood Thinners Addressed: Message sent to support pool to review the patients medication.